# Patient Record
Sex: FEMALE | Race: WHITE | NOT HISPANIC OR LATINO | Employment: UNEMPLOYED | URBAN - METROPOLITAN AREA
[De-identification: names, ages, dates, MRNs, and addresses within clinical notes are randomized per-mention and may not be internally consistent; named-entity substitution may affect disease eponyms.]

---

## 2022-10-29 ENCOUNTER — HOSPITAL ENCOUNTER (EMERGENCY)
Facility: HOSPITAL | Age: 27
Discharge: HOME | End: 2022-10-29
Attending: EMERGENCY MEDICINE

## 2022-10-29 VITALS
TEMPERATURE: 98.1 F | RESPIRATION RATE: 18 BRPM | SYSTOLIC BLOOD PRESSURE: 133 MMHG | OXYGEN SATURATION: 96 % | HEART RATE: 106 BPM | DIASTOLIC BLOOD PRESSURE: 81 MMHG

## 2022-10-29 DIAGNOSIS — J45.901 MILD ASTHMA WITH EXACERBATION, UNSPECIFIED WHETHER PERSISTENT: Primary | ICD-10-CM

## 2022-10-29 RX ORDER — PREDNISONE 20 MG/1
40 TABLET ORAL ONCE
Status: COMPLETED | OUTPATIENT
Start: 2022-10-30 | End: 2022-10-29

## 2022-10-29 RX ADMIN — PREDNISONE 40 MG: 20 TABLET ORAL at 23:59

## 2022-10-30 NOTE — DISCHARGE INSTRUCTIONS
Return with any new or worsening symptoms  Follow up with PCP within the next 1 week  Take steroids as prescribed by urgent care

## 2022-10-30 NOTE — ED PROVIDER NOTES
History  Chief Complaint   Patient presents with   • Asthma     Asthma exac as per pt, pt appears to be in no distress, pt has no voice, states that she was at urgent care earlier, states that she needs a neb      HPI    None       Past Medical History:   Diagnosis Date   • Asthma        No past surgical history on file  No family history on file  I have reviewed and agree with the history as documented  E-Cigarette/Vaping     E-Cigarette/Vaping Substances          Review of Systems    Physical Exam  Physical Exam    Vital Signs  ED Triage Vitals [10/29/22 2247]   Temperature Pulse Respirations Blood Pressure SpO2   98 1 °F (36 7 °C) (!) 106 18 133/81 96 %      Temp Source Heart Rate Source Patient Position - Orthostatic VS BP Location FiO2 (%)   Oral Monitor Sitting Left arm --      Pain Score       --           Vitals:    10/29/22 2247   BP: 133/81   Pulse: (!) 106   Patient Position - Orthostatic VS: Sitting         Visual Acuity      ED Medications  Medications   predniSONE tablet 40 mg (40 mg Oral Given 10/29/22 2015)       Diagnostic Studies  Results Reviewed     None                 No orders to display              Procedures  Procedures         ED Course                               SBIRT 20yo+    Flowsheet Row Most Recent Value   SBIRT (23 yo +)    In order to provide better care to our patients, we are screening all of our patients for alcohol and drug use  Would it be okay to ask you these screening questions?  Unable to answer at this time Filed at: 10/29/2022 2330                    MDM    Disposition  Final diagnoses:   Mild asthma with exacerbation, unspecified whether persistent     Time reflects when diagnosis was documented in both MDM as applicable and the Disposition within this note     Time User Action Codes Description Comment    10/29/2022 11:54 PM Jaelyn Dash [J40] Bronchitis     10/29/2022 11:54 PM Keyla Diaz [J40] Bronchitis     10/29/2022 11:54 PM Edgar Alejandrina Michelle Add [J45 901] Mild asthma with exacerbation, unspecified whether persistent       ED Disposition     ED Disposition   Discharge    Condition   Stable    Date/Time   Sat Oct 29, 2022 [address] discharge to home/self care  Follow-up Information     Follow up With Specialties Details Why Contact Info Additional [address] [facility] Emergency Department Emergency Medicine Go to  If symptoms worsen [address] Emergency Department, [address] [place], [place], [address]  Call today To schedule an appointment for follow-up [address]  [address]  [phone]             There are no discharge medications for this patient  No discharge procedures on file      PDMP Review     None          ED Provider  Electronically Signed by low    Patient Progress  Patient progress: improved      Disposition  Final diagnoses:   Mild asthma with exacerbation, unspecified whether persistent     Time reflects when diagnosis was documented in both MDM as applicable and the Disposition within this note     Time User Action Codes Description Comment    10/29/2022 11:54 PM Floryantonio Montalvotre Add [J40] Bronchitis     10/29/2022 11:54 PM Sharmaine Higginbotham Sandysergey Feliz [J40] Bronchitis     10/29/2022 11:54 PM Bradpepitoshantal Nighat Add [[id number]] Mild asthma with exacerbation, unspecified whether persistent       ED Disposition     ED Disposition   Discharge    Condition   Stable    Date/Time   Sat Oct 29, 2022 2357    [address] discharge to home/self care  Follow-up Information     Follow up With Specialties Details Why Contact Info Additional [address] [facility] Emergency Department Emergency Medicine Go to  If symptoms worsen [address] Emergency Department, [address] [place], [place], [address] [facility]  Call today To schedule an appointment for follow-up [address] [place] [address]  [phone]             There are no discharge medications for this patient  No discharge procedures on file      PDMP Review     None          ED Provider  Electronically Signed by           Jose Campa PA-C  11/25/22 4351